# Patient Record
Sex: MALE | Race: OTHER | HISPANIC OR LATINO | URBAN - METROPOLITAN AREA
[De-identification: names, ages, dates, MRNs, and addresses within clinical notes are randomized per-mention and may not be internally consistent; named-entity substitution may affect disease eponyms.]

---

## 2022-12-04 ENCOUNTER — EMERGENCY (EMERGENCY)
Facility: HOSPITAL | Age: 62
LOS: 1 days | Discharge: HOME | End: 2022-12-04
Payer: MEDICAID

## 2022-12-04 VITALS
DIASTOLIC BLOOD PRESSURE: 78 MMHG | TEMPERATURE: 102 F | WEIGHT: 160.06 LBS | RESPIRATION RATE: 19 BRPM | HEART RATE: 93 BPM | OXYGEN SATURATION: 96 % | SYSTOLIC BLOOD PRESSURE: 130 MMHG

## 2022-12-04 VITALS
TEMPERATURE: 99 F | HEART RATE: 85 BPM | RESPIRATION RATE: 18 BRPM | DIASTOLIC BLOOD PRESSURE: 73 MMHG | SYSTOLIC BLOOD PRESSURE: 121 MMHG | OXYGEN SATURATION: 99 %

## 2022-12-04 LAB
FLUAV AG NPH QL: SIGNIFICANT CHANGE UP
FLUBV AG NPH QL: SIGNIFICANT CHANGE UP
SARS-COV-2 RNA SPEC QL NAA+PROBE: DETECTED

## 2022-12-04 PROCEDURE — 87637 SARSCOV2&INF A&B&RSV AMP PRB: CPT

## 2022-12-04 PROCEDURE — 99283 EMERGENCY DEPT VISIT LOW MDM: CPT

## 2022-12-04 PROCEDURE — 99284 EMERGENCY DEPT VISIT MOD MDM: CPT

## 2022-12-04 RX ORDER — IBUPROFEN 200 MG
600 TABLET ORAL ONCE
Refills: 0 | Status: COMPLETED | OUTPATIENT
Start: 2022-12-04 | End: 2022-12-04

## 2022-12-04 RX ORDER — DEXAMETHASONE 0.5 MG/5ML
8 ELIXIR ORAL ONCE
Refills: 0 | Status: COMPLETED | OUTPATIENT
Start: 2022-12-04 | End: 2022-12-04

## 2022-12-04 RX ADMIN — Medication 600 MILLIGRAM(S): at 12:31

## 2022-12-04 RX ADMIN — Medication 8 MILLIGRAM(S): at 12:31

## 2022-12-04 RX ADMIN — Medication 600 MILLIGRAM(S): at 14:04

## 2022-12-04 NOTE — ED PROVIDER NOTE - OBJECTIVE STATEMENT
62-year-old male, history of hypertension, presents for multiple medical complaints.  Since yesterday has noticed intermittent dry cough, generalized joint/body aches, subjective fever and throat pain.  Feels throat is very irritated which makes it difficult to swallow.  Denies any feeling of throat swelling or throat tightness.  Has been taking Tylenol with some relief.  Denies any chest pain, shortness of breath, dizziness, neck stiffness, photophobia, headache, rash or any other complaints.

## 2022-12-04 NOTE — ED PROVIDER NOTE - CLINICAL SUMMARY MEDICAL DECISION MAKING FREE TEXT BOX
62-year-old male, presents for evaluation of viral-like symptoms since yesterday.  Well-appearing, vital signs stable, febrile in triage.  Based on exam there is a low suspicion for deep neck abscess and there are no signs of meningeal irritation.  Lung exam unremarkable.  History exam consistent with viral syndrome.  Will give medication for symptom relief and swab for flu/COVID/RSV with plan to discharge home.

## 2022-12-04 NOTE — ED PROVIDER NOTE - PHYSICAL EXAMINATION
Neck supple and full range of motion.  No nuchal rigidity.  Uvula midline without edema.  No tonsillar swelling or exudate.  Mild pharyngeal erythema without ulcerations.  No trismus.  No anterior cervical lymphadenopathy.

## 2022-12-04 NOTE — ED PROVIDER NOTE - NSFOLLOWUPINSTRUCTIONS_ED_ALL_ED_FT
Seguimiento con el médico de atención primaria en 2-3 días.  Si experimenta síntomas nuevos o que empeoran, o si está preocupado, siempre puede regresar a la emergencia para ezio reevaluación.    * * *    Follow up with the primary care doctor in 2-3 days.  If you experience any new or worsening symptoms or if you are concerned you can always come back to the emergency for a re-evaluation.

## 2022-12-04 NOTE — ED PROVIDER NOTE - PROGRESS NOTE DETAILS
Significant improvement of symptoms, reports feeling very well. Will dc with PMD follow up in 2-3 days. Pt is well appearing walking with steady gait, stable for discharge and follow up without fail with medical doctor. I had a detailed discussion with the patient and/or guardian regarding the historical points, exam findings, and any diagnostic results supporting the discharge diagnosis. Pt educated on care and need for follow up. Strict return instructions and red flag signs and symptoms discussed with patient. Questions answered. Pt shows understanding of discharge information and agrees to follow.

## 2022-12-04 NOTE — ED PROVIDER NOTE - PATIENT PORTAL LINK FT
You can access the FollowMyHealth Patient Portal offered by [facility] by registering at the following website: [email or web address]. By joining [employer]’s FollowMyHealth portal, you will also be able to view your health information using other applications (apps) compatible with our system.

## 2022-12-04 NOTE — ED POST DISCHARGE NOTE - REASON FOR FOLLOW-UP
Other Discussed over the phone about covid+ result and indication for self-isolation. Answered questions.

## 2022-12-04 NOTE — ED ADULT NURSE NOTE - WAS YOUR LAST COVID-19 VACCINE GREATER THAN OR EQUAL TO TWO MONTHS AGO?
04/13/2017  9:55 AM  SW did not receive message from family or receive choices for HH. Contacted all numbers listed on the facesheet. The mobile number for the Pt was not correct. There was no way to leave a message on the other two numbers. Upon visiting the room each time, the family indicated they had no experience with HH and did not seem to have a preference. Set up [facility] in [place] to follow up via [facility]. SW will remain available to facilitate this referral.    Miguelina Funez LMSW  Neurocritical Care   [facility]  [id number]     Yes

## 2023-05-09 ENCOUNTER — EMERGENCY (EMERGENCY)
Facility: HOSPITAL | Age: 63
LOS: 1 days | Discharge: HOME | End: 2023-05-09
Attending: STUDENT IN AN ORGANIZED HEALTH CARE EDUCATION/TRAINING PROGRAM
Payer: MEDICAID

## 2023-05-09 VITALS
DIASTOLIC BLOOD PRESSURE: 74 MMHG | TEMPERATURE: 98 F | OXYGEN SATURATION: 98 % | HEART RATE: 65 BPM | SYSTOLIC BLOOD PRESSURE: 135 MMHG | RESPIRATION RATE: 18 BRPM

## 2023-05-09 VITALS
DIASTOLIC BLOOD PRESSURE: 81 MMHG | TEMPERATURE: 98 F | HEIGHT: 63 IN | HEART RATE: 66 BPM | OXYGEN SATURATION: 98 % | RESPIRATION RATE: 18 BRPM | SYSTOLIC BLOOD PRESSURE: 154 MMHG | WEIGHT: 164.02 LBS

## 2023-05-09 PROCEDURE — 99284 EMERGENCY DEPT VISIT MOD MDM: CPT

## 2023-05-09 PROCEDURE — 93005 ELECTROCARDIOGRAM TRACING: CPT

## 2023-05-09 PROCEDURE — 99283 EMERGENCY DEPT VISIT LOW MDM: CPT

## 2023-05-09 RX ORDER — ACETAMINOPHEN 500 MG
975 TABLET ORAL ONCE
Refills: 0 | Status: COMPLETED | OUTPATIENT
Start: 2023-05-09 | End: 2023-05-09

## 2023-05-09 RX ORDER — IBUPROFEN 200 MG
600 TABLET ORAL ONCE
Refills: 0 | Status: COMPLETED | OUTPATIENT
Start: 2023-05-09 | End: 2023-05-09

## 2023-05-09 RX ADMIN — Medication 975 MILLIGRAM(S): at 06:03

## 2023-05-09 RX ADMIN — Medication 600 MILLIGRAM(S): at 06:03

## 2023-05-09 NOTE — ED PROVIDER NOTE - OBJECTIVE STATEMENT
63-year-old male, PMH of HTN, presenting with left arm pain.  Patient reports pain has been present for the past month but has been worse over the past 2 days.  Pain prevents him from sleeping.  Denies any chest pain or trouble breathing.  Denies any falls or otherwise injuring himself.

## 2023-05-09 NOTE — ED PROVIDER NOTE - PHYSICAL EXAMINATION
General: well appearing male, no acute distress   HEENT: normocephalic, atraumatic   Respiratory: normal work of breathing  MSK: left UE without tenderness, full wrist, elbow and shoulder ROM    Skin: warm, dry   Neuro: A&Ox3  Psych: appropriate affect

## 2023-05-09 NOTE — ED PROVIDER NOTE - NSFOLLOWUPINSTRUCTIONS_ED_ALL_ED_FT
You were seen in the emergency department for left arm pain.    Please follow-up with your pediatrician within the next 48 hours.    Please take Tylenol and ibuprofen as prescribed on the bottles for pain control.    If you have any worsening symptoms, severe arm pain, chest pain, trouble breathing, please return to the emergency department. Lo vieron en el departamento de emergencias por dolor en el brazo frida.    Por favor, alfredo un seguimiento con trujillo pediatra dentro de las próximas 48 horas.    Rampart Tylenol e ibuprofeno según lo prescrito en los frascos para controlar el dolor.    Si tiene algún síntoma que empeora, dolor intenso en el brazo, dolor en el pecho, dificultad para respirar, regrese al departamento de emergencias.    Translation via Google Translate. Cannot guarantee accuracy.     You were seen in the emergency department for left arm pain.    Please follow-up with your pediatrician within the next 48 hours.    Please take Tylenol and ibuprofen as prescribed on the bottles for pain control.    If you have any worsening symptoms, severe arm pain, chest pain, trouble breathing, please return to the emergency department.

## 2023-05-09 NOTE — ED PROVIDER NOTE - CLINICAL SUMMARY MEDICAL DECISION MAKING FREE TEXT BOX
63-year-old male presenting with left arm pain.  Denies any chest pain.  Left arm with full range of motion, nontender.  Likely MSK pain versus radiculopathy.  Low concern for ACS given nonischemic EKG and no chest pain.  Will provide analgesia.  Patient stable for outpatient follow-up.

## 2023-05-09 NOTE — ED PROVIDER NOTE - PATIENT PORTAL LINK FT
You can access the FollowMyHealth Patient Portal offered by [facility] by registering at the following website: [email or web address]. By joining [employer]’s FollowMyHealth portal, you will also be able to view your health information using other applications (apps) compatible with our system.

## 2024-03-23 ENCOUNTER — EMERGENCY (EMERGENCY)
Facility: HOSPITAL | Age: 64
LOS: 1 days | Discharge: HOME | End: 2024-03-23
Attending: EMERGENCY MEDICINE
Payer: COMMERCIAL

## 2024-03-23 VITALS
SYSTOLIC BLOOD PRESSURE: 159 MMHG | HEART RATE: 78 BPM | RESPIRATION RATE: 20 BRPM | OXYGEN SATURATION: 97 % | WEIGHT: 165.35 LBS | DIASTOLIC BLOOD PRESSURE: 79 MMHG | TEMPERATURE: 98 F

## 2024-03-23 VITALS — OXYGEN SATURATION: 97 %

## 2024-03-23 PROCEDURE — 99284 EMERGENCY DEPT VISIT MOD MDM: CPT

## 2024-03-23 PROCEDURE — 99283 EMERGENCY DEPT VISIT LOW MDM: CPT

## 2024-03-23 RX ORDER — METHOCARBAMOL 500 MG/1
1500 TABLET, FILM COATED ORAL ONCE
Refills: 0 | Status: COMPLETED | OUTPATIENT
Start: 2024-03-23 | End: 2024-03-23

## 2024-03-23 RX ORDER — METHOCARBAMOL 500 MG/1
2 TABLET, FILM COATED ORAL
Qty: 30 | Refills: 0
Start: 2024-03-23 | End: 2024-03-27

## 2024-03-23 RX ORDER — IBUPROFEN 200 MG
600 TABLET ORAL ONCE
Refills: 0 | Status: COMPLETED | OUTPATIENT
Start: 2024-03-23 | End: 2024-03-23

## 2024-03-23 RX ORDER — IBUPROFEN 200 MG
1 TABLET ORAL
Qty: 28 | Refills: 0
Start: 2024-03-23 | End: 2024-03-29

## 2024-03-23 RX ADMIN — METHOCARBAMOL 1500 MILLIGRAM(S): 500 TABLET, FILM COATED ORAL at 21:10

## 2024-03-23 RX ADMIN — Medication 600 MILLIGRAM(S): at 21:09

## 2024-03-23 NOTE — ED PROVIDER NOTE - CLINICAL SUMMARY MEDICAL DECISION MAKING FREE TEXT BOX
. 64-year-old male with history of hypertension on aspirin 81 mg a combination blood pressure medication presents to the ED complaining of gradually worsening bilateral neck pain that seems to shoot up and down to the front of his right side of face since this morning.  Patient noticed worsening around 4 PM while at work.  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  No fever, no paresthesia, no syncope, no visual changes, no instrumentation to back or manipulation. took a tylenol at 6p with no relieve    neck strain. no red flags. pt is neurovascularly intact. meds dc

## 2024-03-23 NOTE — ED ADULT NURSE NOTE - OBJECTIVE STATEMENT
Pt presents to ED c/o neck pain that started at work. States he was standing when sudden onset of pain began with no radiation. Describes pain as jolting aches that are intermittent. Denies trauma or heavy lifting at the time but endorses heavy lifting for work.

## 2024-03-23 NOTE — ED ADULT NURSE NOTE - NSFALLUNIVINTERV_ED_ALL_ED
Bed/Stretcher in lowest position, wheels locked, appropriate side rails in place/Call bell, personal items and telephone in reach/Instruct patient to call for assistance before getting out of bed/chair/stretcher/Non-slip footwear applied when patient is off stretcher/[place] to call system/Physically safe environment - no spills, clutter or unnecessary equipment/Purposeful proactive rounding/Room/bathroom lighting operational, light cord in reach

## 2024-03-23 NOTE — ED PROVIDER NOTE - OBJECTIVE STATEMENT
. 64-year-old male with history of hypertension on aspirin 81 mg a combination blood pressure medication presents to the ED complaining of gradually worsening bilateral neck pain that seems to shoot up and down to the front of his right side of face since this morning.  Patient noticed worsening around 4 PM while at work.  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  No fever, no paresthesia, no syncope, no visual changes, no instrumentation to back or manipulation. took a tylenol at 6p with no relieve

## 2024-03-23 NOTE — ED PROVIDER NOTE - PATIENT PORTAL LINK FT
You can access the FollowMyHealth Patient Portal offered by [facility] by registering at the following website: [email or web address]. By joining [employer]’s FollowMyHealth portal, you will also be able to view your health information using other applications (apps) compatible with our system.

## 2024-03-23 NOTE — ED PROVIDER NOTE - NSFOLLOWUPINSTRUCTIONS_ED_ALL_ED_FT
please use heat packs to area 3x/day 20 mins each session, take motrin 600mg every 6 hrs as needed, tylenol 650mg every 4 hrs as needed, stay active, no heavy lifting and return if symptoms  worsens. see your MD for physical therapy. should last about 1 week.    use compresas térmicas en el área 3 veces al día jonas 20 minutos en cada sesión, tome motrin 600 mg cada 6 horas según sea necesario, tylenol 650 mg cada 4 horas según sea necesario, manténgase activo, no levante objetos pesados ??y regrese si los síntomas empeoran. Consulte a trujillo médico para recibir fisioterapia. debería durar aproximadamente 1 semana.

## 2024-03-23 NOTE — ED PROVIDER NOTE - CONDITION AT DISCHARGE:
Pt received sitting in dining room. Pleasant mood, talking with staff. Alert and oriented to person and place. Problem: Falls - Risk of  Goal: *Absence of Falls  Description: Document Garthney Capri Fall Risk and appropriate interventions in the flowsheet.   Outcome: Progressing Towards Goal  Note: Fall Risk Interventions:  Mobility Interventions: Utilize walker, cane, or other assistive device    Mentation Interventions: Adequate sleep, hydration, pain control    Medication Interventions: Teach patient to arise slowly    Elimination Interventions: Bed/chair exit alarm    History of Falls Interventions: Bed/chair exit alarm         Problem: Altered Thought Process (Adult/Pediatric)  Goal: *STG: Participates in treatment plan  Outcome: Progressing Towards Goal Satisfactory

## 2024-03-23 NOTE — ED ADULT NURSE NOTE - NS ED NURSE DISCH DISPOSITION
30f no past medical history, presents with 3 days of persistent left lower quadrant pain, worsening. Patient states she started her period 4/1/2022. Patient states she had it for three days and then developed pain when she typically has pain before her period.  ncat  moderate distress secondary to pain  non-tachycardic  non-tachypneic  trachea midline  no rash/vesicles/petechiae  cooperative, with capacity and insight   Hiram Nogueira MD, FACEP: In this physician's medical judgement based on clinical history and physical exam: patient with lower abdominal pain to left lower quadrant will get iv, cbc, cmp, ua, urine culture, tvus  Will follow up on labs, analgesia, imaging, reassess and disposition as clinically indicated.  *The above represents an initial assessment/impression. Please refer to my progress notes below for potential changes in patient clinical course* Discharged 30f no past medical history, presents with 3 days of persistent left lower quadrant pain, worsening. Patient states she started her period 4/1/2022. Patient states she had it for three days and then developed pain when she typically has pain before her period.  ncat  moderate distress secondary to pain  non-tachycardic  non-tachypneic  trachea midline  no rash/vesicles/petechiae  cooperative, with capacity and insight   Hiram Nogueira MD, FACEP: In this physician's medical judgement based on clinical history and physical exam: patient with lower abdominal pain to left lower quadrant will get iv, cbc, cmp, ua, urine culture, tvus  Will follow up on labs, analgesia, imaging, reassess and disposition as clinically indicated.  *The above represents an initial assessment/impression. Please refer to my progress notes below for potential changes in patient clinical course*  Hiram Nogueira MD FACEP note of transfer at the usual time of sign out: Receiving team will follow up on labs, analgesia, any clinical imaging, reassess and disposition as clinically indicated.  Details of patient and plan conveyed to receiving physician and conveyed back for understanding.  There were no questions at this time about the patient's status, disposition, and plan. Patient's care to be taken over by receiving physician at this time, all decisions regarding the progression of care will be made at their discretion.